# Patient Record
Sex: FEMALE | Race: WHITE | NOT HISPANIC OR LATINO | Employment: UNEMPLOYED | URBAN - METROPOLITAN AREA
[De-identification: names, ages, dates, MRNs, and addresses within clinical notes are randomized per-mention and may not be internally consistent; named-entity substitution may affect disease eponyms.]

---

## 2024-10-04 PROBLEM — Z85.528 HISTORY OF RENAL CELL CARCINOMA: Status: ACTIVE | Noted: 2024-10-04
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
## 2024-10-04 PROBLEM — Z87.891 PERSONAL HISTORY OF TOBACCO USE: Status: ACTIVE | Noted: 2024-10-04

## 2024-10-04 PROBLEM — R93.89 ABNORMAL CT SCAN: Status: RESOLVED | Noted: 2021-02-25 | Resolved: 2024-10-04

## 2024-10-04 PROBLEM — E66.01 MORBID OBESITY DUE TO EXCESS CALORIES: Status: RESOLVED | Noted: 2020-12-21 | Resolved: 2024-10-04

## 2024-10-04 PROBLEM — Z91.89 CARDIOVASCULAR EVENT RISK: Status: RESOLVED | Noted: 2022-04-12 | Resolved: 2024-10-04

## 2024-10-04 PROBLEM — E78.1 HYPERTRIGLYCERIDEMIA: Status: RESOLVED | Noted: 2022-04-12 | Resolved: 2024-10-04

## 2024-10-15 ENCOUNTER — OFFICE VISIT (OUTPATIENT)
Dept: OTOLARYNGOLOGY | Facility: CLINIC | Age: 59
End: 2024-10-15
Payer: MEDICAID

## 2024-10-15 VITALS — BODY MASS INDEX: 43.22 KG/M2 | WEIGHT: 253.13 LBS | HEIGHT: 64 IN

## 2024-10-15 DIAGNOSIS — Z87.891 PERSONAL HISTORY OF TOBACCO USE: ICD-10-CM

## 2024-10-15 DIAGNOSIS — R49.0 HOARSENESS: ICD-10-CM

## 2024-10-15 DIAGNOSIS — J31.2 CHRONIC SORE THROAT: Primary | ICD-10-CM

## 2024-10-15 DIAGNOSIS — G47.33 OSA (OBSTRUCTIVE SLEEP APNEA): ICD-10-CM

## 2024-10-15 PROCEDURE — 3008F BODY MASS INDEX DOCD: CPT | Mod: CPTII,,, | Performed by: OTOLARYNGOLOGY

## 2024-10-15 PROCEDURE — 31575 DIAGNOSTIC LARYNGOSCOPY: CPT | Mod: S$PBB,,, | Performed by: OTOLARYNGOLOGY

## 2024-10-15 PROCEDURE — 1159F MED LIST DOCD IN RCRD: CPT | Mod: CPTII,,, | Performed by: OTOLARYNGOLOGY

## 2024-10-15 PROCEDURE — 31575 DIAGNOSTIC LARYNGOSCOPY: CPT | Mod: PBBFAC,PN | Performed by: OTOLARYNGOLOGY

## 2024-10-15 PROCEDURE — 3044F HG A1C LEVEL LT 7.0%: CPT | Mod: CPTII,,, | Performed by: OTOLARYNGOLOGY

## 2024-10-15 PROCEDURE — 99999 PR PBB SHADOW E&M-EST. PATIENT-LVL IV: CPT | Mod: PBBFAC,,, | Performed by: OTOLARYNGOLOGY

## 2024-10-15 PROCEDURE — 1160F RVW MEDS BY RX/DR IN RCRD: CPT | Mod: CPTII,,, | Performed by: OTOLARYNGOLOGY

## 2024-10-15 PROCEDURE — 99204 OFFICE O/P NEW MOD 45 MIN: CPT | Mod: 25,S$PBB,, | Performed by: OTOLARYNGOLOGY

## 2024-10-15 PROCEDURE — 99214 OFFICE O/P EST MOD 30 MIN: CPT | Mod: PBBFAC,PN | Performed by: OTOLARYNGOLOGY

## 2024-10-15 RX ORDER — OMEPRAZOLE 40 MG/1
40 CAPSULE, DELAYED RELEASE ORAL
Qty: 60 CAPSULE | Refills: 3 | Status: SHIPPED | OUTPATIENT
Start: 2024-10-15 | End: 2025-02-12

## 2024-10-15 NOTE — PROGRESS NOTES
"Subjective:       Patient ID: Yenifer Cornelius is a 58 y.o. female.    Chief Complaint: Sore Throat (Pt c/o sore throat and trouble swallowing )      This patient comes in with the daughter to discuss some issues what has really precipitated her visit is that a friend of hers was diagnose with "throat cancer" and she does have chronic sore throat has a long smoking history in his concerned about that possibility.    She smokes about a pack per day and vapes some she has quit before using Chantix and she is back on Chantix she tells me trying to quit once again.  She has a history of sleep apnea but there was some problem with her equipment and a new mask has come in so she has been untreated for some time but has the equipment and anticipate setting that up and revisiting treatment soon.          Objective:      ENT Physical Exam    So the patient has mildly rough voice she is overweight smells of cigarette smoke     Her nasal exam looks pretty good oropharynx shows significant redness in general posterior oropharynx soft palate    Her neck is without adenopathy her thyroid is not palpable and that is not tender to palpate     We discussed the benefits of a fiberoptic exam that include a better view of the larynx a better view of the anterior aspect of the larynx a more magnified view of the larynx hypopharynx and nasopharynx in the context of the patient's particular complaint and the patient wishes to proceed with this minor examination.  In this particular patient examination with the mirror was attempted but inadequate to provide the necessary exam.    Afrin and lidocaine were atomized into the nasal airway 5 or so minutes were given for the decongestant and anesthetic to take effect and then the flexible fiberoptic laryngoscope was passed through the nasal cavity and the exam proceeded.    The right side was chosen to approach and the scope passed easily the nasopharynx is generally red presumably from cigarette " smoke exposure the hypopharynx larynx are all prominently read in a diffuse fashion although there is focal redness of the right arytenoid in particular but no ulceration.      She has mild early Kaila's edema that is symmetric bilaterally with no leukoplakia or focal lesions.            Assessment:       1. Chronic sore throat    2. Personal history of tobacco use    3. Hoarseness    4. CAROLINE (obstructive sleep apnea)         Plan:          So I have encouraged her to continue her efforts to quit smoking even if it include staying on a vaporizer but not smoking cigarettes.  I have encouraged her to take action to get her CPAP device up and running she has the equipment come in in her daughter says they will set it up soon     She does mentioned some regurgitation at night some reflux events and the redness, while the general redness is likely from the heavy smoking and vaping, there is focal redness of the arytenoid that maybe reflux-related and given her body habitus her history towards heartburn and indigestion that she controls with diet her untreated sleep apnea I think reflux is probably the most likely explanation for her chronic sore throat.    I have put her on b.i.d. PPIs to help with the chronic sore throat and the hoarseness although some of that as probably Kaila's edema, I have mentioned to her that twice a day dosing is not for long term that is for a month or so to get things improved and then to switch to once a day and to contact me if they are not sure what to do and if it has not help the sore throat I need to see her back.

## 2024-10-30 ENCOUNTER — OFFICE VISIT (OUTPATIENT)
Dept: FAMILY MEDICINE | Facility: CLINIC | Age: 59
End: 2024-10-30
Payer: MEDICAID

## 2024-10-30 VITALS
SYSTOLIC BLOOD PRESSURE: 130 MMHG | WEIGHT: 254.5 LBS | OXYGEN SATURATION: 97 % | HEIGHT: 64 IN | HEART RATE: 92 BPM | BODY MASS INDEX: 43.45 KG/M2 | DIASTOLIC BLOOD PRESSURE: 84 MMHG

## 2024-10-30 DIAGNOSIS — E66.813 CLASS 3 SEVERE OBESITY DUE TO EXCESS CALORIES WITH SERIOUS COMORBIDITY AND BODY MASS INDEX (BMI) OF 40.0 TO 44.9 IN ADULT: Primary | ICD-10-CM

## 2024-10-30 DIAGNOSIS — E66.01 CLASS 3 SEVERE OBESITY DUE TO EXCESS CALORIES WITH SERIOUS COMORBIDITY AND BODY MASS INDEX (BMI) OF 40.0 TO 44.9 IN ADULT: Primary | ICD-10-CM

## 2024-10-30 DIAGNOSIS — I10 PRIMARY HYPERTENSION: ICD-10-CM

## 2024-10-30 DIAGNOSIS — Z87.891 PERSONAL HISTORY OF TOBACCO USE: ICD-10-CM

## 2024-10-30 DIAGNOSIS — E78.5 DYSLIPIDEMIA: ICD-10-CM

## 2024-10-30 PROCEDURE — 3075F SYST BP GE 130 - 139MM HG: CPT | Mod: CPTII,S$GLB,, | Performed by: STUDENT IN AN ORGANIZED HEALTH CARE EDUCATION/TRAINING PROGRAM

## 2024-10-30 PROCEDURE — 3008F BODY MASS INDEX DOCD: CPT | Mod: CPTII,S$GLB,, | Performed by: STUDENT IN AN ORGANIZED HEALTH CARE EDUCATION/TRAINING PROGRAM

## 2024-10-30 PROCEDURE — 3079F DIAST BP 80-89 MM HG: CPT | Mod: CPTII,S$GLB,, | Performed by: STUDENT IN AN ORGANIZED HEALTH CARE EDUCATION/TRAINING PROGRAM

## 2024-10-30 PROCEDURE — 1160F RVW MEDS BY RX/DR IN RCRD: CPT | Mod: CPTII,S$GLB,, | Performed by: STUDENT IN AN ORGANIZED HEALTH CARE EDUCATION/TRAINING PROGRAM

## 2024-10-30 PROCEDURE — 99214 OFFICE O/P EST MOD 30 MIN: CPT | Mod: S$GLB,,, | Performed by: STUDENT IN AN ORGANIZED HEALTH CARE EDUCATION/TRAINING PROGRAM

## 2024-10-30 PROCEDURE — 1159F MED LIST DOCD IN RCRD: CPT | Mod: CPTII,S$GLB,, | Performed by: STUDENT IN AN ORGANIZED HEALTH CARE EDUCATION/TRAINING PROGRAM

## 2024-10-30 PROCEDURE — G2211 COMPLEX E/M VISIT ADD ON: HCPCS | Mod: S$GLB,,, | Performed by: STUDENT IN AN ORGANIZED HEALTH CARE EDUCATION/TRAINING PROGRAM

## 2024-10-30 PROCEDURE — 3044F HG A1C LEVEL LT 7.0%: CPT | Mod: CPTII,S$GLB,, | Performed by: STUDENT IN AN ORGANIZED HEALTH CARE EDUCATION/TRAINING PROGRAM

## 2024-10-30 RX ORDER — VARENICLINE TARTRATE 1 MG/1
1 TABLET, FILM COATED ORAL 2 TIMES DAILY
Qty: 60 TABLET | Refills: 2 | Status: SHIPPED | OUTPATIENT
Start: 2024-10-30

## 2024-10-31 DIAGNOSIS — M79.671 RIGHT FOOT PAIN: Primary | ICD-10-CM

## 2024-11-04 ENCOUNTER — HOSPITAL ENCOUNTER (OUTPATIENT)
Dept: RADIOLOGY | Facility: HOSPITAL | Age: 59
Discharge: HOME | End: 2024-11-04
Attending: ORTHOPAEDIC SURGERY
Payer: MEDICAID

## 2024-11-04 ENCOUNTER — OFFICE VISIT (OUTPATIENT)
Dept: ORTHOPEDICS | Facility: CLINIC | Age: 59
End: 2024-11-04
Payer: MEDICAID

## 2024-11-04 DIAGNOSIS — M79.671 RIGHT FOOT PAIN: Primary | ICD-10-CM

## 2024-11-04 DIAGNOSIS — M79.671 RIGHT FOOT PAIN: ICD-10-CM

## 2024-11-04 PROCEDURE — 99213 OFFICE O/P EST LOW 20 MIN: CPT | Mod: PBBFAC,25,PN | Performed by: ORTHOPAEDIC SURGERY

## 2024-11-04 PROCEDURE — 1160F RVW MEDS BY RX/DR IN RCRD: CPT | Mod: CPTII,,, | Performed by: ORTHOPAEDIC SURGERY

## 2024-11-04 PROCEDURE — 73610 X-RAY EXAM OF ANKLE: CPT | Mod: 26,RT,, | Performed by: RADIOLOGY

## 2024-11-04 PROCEDURE — 1159F MED LIST DOCD IN RCRD: CPT | Mod: CPTII,,, | Performed by: ORTHOPAEDIC SURGERY

## 2024-11-04 PROCEDURE — 99215 OFFICE O/P EST HI 40 MIN: CPT | Mod: S$PBB,,, | Performed by: ORTHOPAEDIC SURGERY

## 2024-11-04 PROCEDURE — 99999 PR PBB SHADOW E&M-EST. PATIENT-LVL III: CPT | Mod: PBBFAC,,, | Performed by: ORTHOPAEDIC SURGERY

## 2024-11-04 PROCEDURE — 73610 X-RAY EXAM OF ANKLE: CPT | Mod: TC,PN,RT

## 2024-11-04 PROCEDURE — 3044F HG A1C LEVEL LT 7.0%: CPT | Mod: CPTII,,, | Performed by: ORTHOPAEDIC SURGERY

## 2024-11-04 RX ORDER — DICLOFENAC SODIUM 75 MG/1
75 TABLET, DELAYED RELEASE ORAL 2 TIMES DAILY
Qty: 28 TABLET | Refills: 0 | Status: SHIPPED | OUTPATIENT
Start: 2024-11-04

## 2024-11-04 NOTE — PROGRESS NOTES
Subjective:      Patient ID: Yenifer Cornelius is a 59 y.o. female.    Chief Complaint: Pain of the Right Ankle    HPI  59-year-old female presents with a several years of intermittent but progressive right foot and ankle pain and weakness.  She recalls a traumatic event 3-4 years ago she has had no recent treatment for her foot and ankle pain.  She is describing progressive pain with the walking.  Feels like her ankles going to give way.  ROS      Objective:    Ortho Exam     Constitutional:   Patient is alert  and oriented in no acute distress  HEENT:  normocephalic atraumatic; PERRL EOMI  Neck:  Supple without adenopathy  Cardiovascular:  Normal rate and rhythm  Pulmonary:  Normal respiratory effort normal chest wall expansion  Abdominal:  Nonprotuberant nondistended  Musculoskeletal:  Patient has a minimally antalgic gait  Some subtle swelling over the lateral aspect of the foot and ankle  Particularly along the course of the peroneal tendons.  She has some pes planus  She has adequate motor strength  Neurological:  No focal defect; cranial nerves 2-12 grossly intact  Psychiatric/behavioral:  Mood and behavior normal      X-Ray Ankle Complete Right  Narrative: EXAMINATION:  jXR ANKLE COMPLETE 3 VIEW RIGHT    CLINICAL HISTORY:  Pain in right foot    TECHNIQUE:  AP, lateral, and oblique images of the right ankle were performed.    COMPARISON:  08/19/2022.    FINDINGS:  There is soft tissue swelling.  No acute fracture.  There is posttraumatic deformity involving the tip of the medial malleolus.  Tibiotalar articulation intact.  There is bone demineralization.  Chronic pes planus.  Impression: 1. Soft tissue swelling without acute fracture.  2. Bone demineralization.  3. Pes planus.    Electronically signed by: Rao Norris  Date:    11/04/2024  Time:    13:47       My Radiographs Findings:    I have personally reviewed radiographs and concur with above findings    Assessment:       Encounter Diagnosis   Name Primary?     Right foot pain Yes         Plan:       I have discussed medical condition treatment options with her at length.  For ankle support we will get him into a walking boot I have discussed ice elevation compressive wrapping NSAIDs as needed.  I think she would benefit from some formal therapy.  If symptoms persist I would suggest follow up with the ortho foot and ankle.        Past Medical History:   Diagnosis Date    Anxiety     Arthritis     Back pain     Cancer     RENAL  NEW DX    COPD (chronic obstructive pulmonary disease)     Depression     Hepatitis C     took tx    Hypertension     Migraines     with aura    Renal disorder     STONES NOW    Sleep apnea     Wears dentures     UPPER ONLY     Past Surgical History:   Procedure Laterality Date    CHOLECYSTECTOMY      COLONOSCOPY  01/25/2019    Radha Hayes, records in media    COLONOSCOPY N/A 3/27/2023    Procedure: COLONOSCOPY;  Surgeon: Fuad Guzman MD;  Location: [facility];  Service: General;  Laterality: N/A;    ENDOSCOPIC ULTRASOUND OF UPPER GASTROINTESTINAL TRACT N/A 02/25/2021    Procedure: ULTRASOUND, UPPER GI TRACT, ENDOSCOPIC;  Surgeon: Jose Miguel Vuong MD;  Location: [facility] ([address] [place] [address]);  Service: Endoscopy;  Laterality: N/A;  UEUS/FNA for enlarged lymph node reauested by Dr Alec Everett.  Rapid Covid-19 test 1130 (scheduled late) - pg    ESOPHAGOGASTRODUODENOSCOPY N/A 01/13/2021    Procedure: ESOPHAGOGASTRODUODENOSCOPY (EGD);  Surgeon: Naseem Hayes MD;  Location: [facility];  Service: Endoscopy;  Laterality: N/A;    ESOPHAGOGASTRODUODENOSCOPY N/A 05/24/2022    Procedure: EGD (ESOPHAGOGASTRODUODENOSCOPY);  Surgeon: Naseem Hayes MD;  Location: [facility];  Service: Endoscopy;  Laterality: N/A;    ESOPHAGOGASTRODUODENOSCOPY  01/25/2019    Naseem Thomson MD records in media    EXCISION, LESION, BREAST, WITH NEEDLE LOCALIZATION Left 4/17/2024    Procedure: EXCISION, LESION, BREAST, WITH NEEDLE LOCALIZATION;  Surgeon:  Mode Guevara MD;  Location: [facility] OR;  Service: General;  Laterality: Left;    HERNIA REPAIR      HIATAL HERNIA X 2    HYSTERECTOMY      Partial    REPAIR OF INCARCERATED VENTRAL HERNIA WITHOUT HISTORY OF PRIOR REPAIR N/A 5/3/2023    Procedure: REPAIR, HERNIA, VENTRAL, INCARCERATED, WITHOUT HISTORY OF PRIOR REPAIR;  Surgeon: Fuad Guzman MD;  Location: [facility] OR;  Service: General;  Laterality: N/A;    ROBOT-ASSISTED LAPAROSCOPIC NEPHRECTOMY Right 03/17/2021    Procedure: ROBOTIC NEPHRECTOMY;  Surgeon: Olivia Tam MD;  Location: [facility] OR;  Service: Urology;  Laterality: Right;         Current Outpatient Medications:     albuterol (PROVENTIL/VENTOLIN HFA) 90 mcg/actuation inhaler, Inhale 2 puffs into the lungs every 6 (six) hours as needed for Wheezing., Disp: 18 g, Rfl: 5    amLODIPine (NORVASC) 5 MG tablet, Take 1 tablet (5 mg total) by mouth 2 (two) times daily., Disp: 180 tablet, Rfl: 2    diphenoxylate-atropine 2.5-0.025 mg (LOMOTIL) 2.5-0.025 mg per tablet, Take 1 tablet by mouth 4 (four) times daily as needed for Diarrhea., Disp: 120 tablet, Rfl: 2    doxepin (SINEQUAN) 10 MG capsule, Take 1 capsule (10 mg total) by mouth every evening., Disp: 30 capsule, Rfl: 2    fluticasone-salmeterol diskus inhaler 250-50 mcg, Inhale 1 puff into the lungs 2 (two) times daily. Controller, Disp: 60 each, Rfl: 11    furosemide (LASIX) 20 MG tablet, Take 1 tablet (20 mg total) by mouth 2 (two) times a day. (Patient taking differently: Take 20 mg by mouth 2 (two) times a day. PRN), Disp: 180 tablet, Rfl: 3    gabapentin (NEURONTIN) 800 MG tablet, TAKE 1 TABLET BY MOUTH 3 TIMES DAILY FOR NERVE PAIN., Disp: 90 tablet, Rfl: 11    nystatin (MYCOSTATIN) cream, Apply topically 2 (two) times daily., Disp: 30 g, Rfl: 0    nystatin (MYCOSTATIN) powder, Apply topically 2 (two) times daily., Disp: 60 g, Rfl: 0    omeprazole (PRILOSEC) 40 MG capsule, Take 1 capsule (40 mg total) by mouth 2 (two) times daily before meals.,  Disp: 60 capsule, Rfl: 3    oxyCODONE-acetaminophen (PERCOCET)  mg per tablet, Take 1 tablet by mouth every 6 (six) hours as needed for Pain., Disp: 30 tablet, Rfl: 0    promethazine (PHENERGAN) 50 MG tablet, Take 1 tablet (50 mg total) by mouth every 6 (six) hours as needed for Nausea., Disp: 60 tablet, Rfl: 5    propranoloL (INDERAL LA) 60 MG 24 hr capsule, Take 1 capsule (60 mg total) by mouth every evening., Disp: 30 capsule, Rfl: 5    rizatriptan (MAXALT) 10 MG tablet, Take 10 mg by mouth., Disp: , Rfl:     rosuvastatin (CRESTOR) 10 MG tablet, Take 1 tablet (10 mg total) by mouth once daily., Disp: 90 tablet, Rfl: 3    SPIRIVA RESPIMAT 2.5 mcg/actuation inhaler, Inhale 2 puffs into the lungs Daily., Disp: , Rfl:     varenicline (CHANTIX) 1 mg Tab, Take 1 tablet (1 mg total) by mouth 2 (two) times daily., Disp: 60 tablet, Rfl: 2    albuterol-ipratropium (DUO-NEB) 2.5 mg-0.5 mg/3 mL nebulizer solution, Take 3 mLs by nebulization every 6 (six) hours as needed for Wheezing. Rescue, Disp: 75 mL, Rfl: 0    diclofenac (VOLTAREN) 75 MG EC tablet, Take 1 tablet (75 mg total) by mouth 2 (two) times daily., Disp: 28 tablet, Rfl: 0    semaglutide, weight loss, 0.25 mg/0.5 mL PnIj, Inject 0.25 mg into the skin every 7 days., Disp: 3 mL, Rfl: 2    Review of patient's allergies indicates:   Allergen Reactions    Amoxicillin      Other reaction(s): (moderate to severe)    Clarithromycin      Other reaction(s): (moderate to severe)    Codeine      Other reaction(s): (moderate to severe)  Other reaction(s): (moderate to severe)    Penicillin      Other reaction(s): (severe)    Sulfa (sulfonamide antibiotics)      Other reaction(s): (severe)    Flagyl [metronidazole hcl]     Sumatriptan        Family History   Problem Relation Name Age of Onset    Alzheimer's disease Mother Amy Nicholas     COPD Mother Amy Nicholas     Stroke Mother Amy Yu     Vision loss Mother Amy Nicholas     Asthma Daughter Dianna  cornelius Leyva daughter     Depression Daughter Dianna cornelius Leyva daughter     Drug abuse Daughter Dianna cornelius Leyva daughter         Meth.    Mental illness Daughter Dianna cornelius Leyva daughter     Breast cancer Maternal Aunt      Colon cancer Maternal Uncle      Breast cancer Paternal Aunt      Alzheimer's disease Maternal Grandmother Marielena Nuñez momma     Hyperthyroidism Maternal Grandmother Marielena brice     Kidney disease Maternal Grandmother Marielena brice     Alzheimer's disease Maternal Grandfather Pedro dave     Stroke Maternal Grandfather Pedro dave     Breast cancer Paternal Grandmother      Depression Son Pedro Cornelius              Social History     Occupational History    Not on file   Tobacco Use    Smoking status: Every Day     Current packs/day: 0.30     Average packs/day: 0.5 packs/day for 43.9 years (22.0 ttl pk-yrs)     Types: Cigarettes, Vaping with nicotine     Start date:      Last attempt to quit: 3/1/2023     Passive exposure: Current    Smokeless tobacco: Never   Substance and Sexual Activity    Alcohol use: No    Drug use: Never    Sexual activity: Not Currently     Partners: Male     Birth control/protection: None

## 2025-01-09 ENCOUNTER — PATIENT OUTREACH (OUTPATIENT)
Dept: ADMINISTRATIVE | Facility: HOSPITAL | Age: 60
End: 2025-01-09
Payer: MEDICAID

## 2025-01-09 ENCOUNTER — OFFICE VISIT (OUTPATIENT)
Dept: FAMILY MEDICINE | Facility: CLINIC | Age: 60
End: 2025-01-09
Payer: MEDICAID

## 2025-01-09 VITALS
BODY MASS INDEX: 42.85 KG/M2 | HEART RATE: 80 BPM | SYSTOLIC BLOOD PRESSURE: 132 MMHG | HEIGHT: 64 IN | DIASTOLIC BLOOD PRESSURE: 74 MMHG | OXYGEN SATURATION: 99 % | WEIGHT: 251 LBS

## 2025-01-09 DIAGNOSIS — F51.04 PSYCHOPHYSIOLOGICAL INSOMNIA: ICD-10-CM

## 2025-01-09 DIAGNOSIS — E66.813 CLASS 3 SEVERE OBESITY DUE TO EXCESS CALORIES WITH SERIOUS COMORBIDITY AND BODY MASS INDEX (BMI) OF 40.0 TO 44.9 IN ADULT: ICD-10-CM

## 2025-01-09 DIAGNOSIS — E78.5 DYSLIPIDEMIA: ICD-10-CM

## 2025-01-09 DIAGNOSIS — I10 PRIMARY HYPERTENSION: ICD-10-CM

## 2025-01-09 DIAGNOSIS — K04.7 DENTAL ABSCESS: ICD-10-CM

## 2025-01-09 DIAGNOSIS — E66.01 CLASS 3 SEVERE OBESITY DUE TO EXCESS CALORIES WITH SERIOUS COMORBIDITY AND BODY MASS INDEX (BMI) OF 40.0 TO 44.9 IN ADULT: ICD-10-CM

## 2025-01-09 DIAGNOSIS — J44.9 CHRONIC OBSTRUCTIVE PULMONARY DISEASE, UNSPECIFIED COPD TYPE: Primary | ICD-10-CM

## 2025-01-09 PROCEDURE — 1159F MED LIST DOCD IN RCRD: CPT | Mod: CPTII,S$GLB,, | Performed by: STUDENT IN AN ORGANIZED HEALTH CARE EDUCATION/TRAINING PROGRAM

## 2025-01-09 PROCEDURE — 1160F RVW MEDS BY RX/DR IN RCRD: CPT | Mod: CPTII,S$GLB,, | Performed by: STUDENT IN AN ORGANIZED HEALTH CARE EDUCATION/TRAINING PROGRAM

## 2025-01-09 PROCEDURE — G2211 COMPLEX E/M VISIT ADD ON: HCPCS | Mod: S$GLB,,, | Performed by: STUDENT IN AN ORGANIZED HEALTH CARE EDUCATION/TRAINING PROGRAM

## 2025-01-09 PROCEDURE — 3078F DIAST BP <80 MM HG: CPT | Mod: CPTII,S$GLB,, | Performed by: STUDENT IN AN ORGANIZED HEALTH CARE EDUCATION/TRAINING PROGRAM

## 2025-01-09 PROCEDURE — 3008F BODY MASS INDEX DOCD: CPT | Mod: CPTII,S$GLB,, | Performed by: STUDENT IN AN ORGANIZED HEALTH CARE EDUCATION/TRAINING PROGRAM

## 2025-01-09 PROCEDURE — 99214 OFFICE O/P EST MOD 30 MIN: CPT | Mod: S$GLB,,, | Performed by: STUDENT IN AN ORGANIZED HEALTH CARE EDUCATION/TRAINING PROGRAM

## 2025-01-09 PROCEDURE — 3075F SYST BP GE 130 - 139MM HG: CPT | Mod: CPTII,S$GLB,, | Performed by: STUDENT IN AN ORGANIZED HEALTH CARE EDUCATION/TRAINING PROGRAM

## 2025-01-09 RX ORDER — BUDESONIDE, GLYCOPYRROLATE, AND FORMOTEROL FUMARATE 160; 9; 4.8 UG/1; UG/1; UG/1
1 AEROSOL, METERED RESPIRATORY (INHALATION) 2 TIMES DAILY
Qty: 10.7 G | Refills: 5 | Status: SHIPPED | OUTPATIENT
Start: 2025-01-09

## 2025-01-09 RX ORDER — CLINDAMYCIN HYDROCHLORIDE 300 MG/1
300 CAPSULE ORAL EVERY 6 HOURS
Qty: 28 CAPSULE | Refills: 0 | Status: SHIPPED | OUTPATIENT
Start: 2025-01-09 | End: 2025-01-16

## 2025-01-09 NOTE — PATIENT INSTRUCTIONS
Jonh Street,     If you are due for any health screening(s) below please notify me so we can arrange them to be ordered and scheduled. Most healthy patients at your age complete them, but you are free to accept or refuse.     If you can't do it, I'll definitely understand. If you can, I'd certainly appreciate it!    All of your core healthy metrics are met.

## 2025-01-09 NOTE — PROGRESS NOTES
Population Health Chart Review & Patient Outreach Details      Additional Pop Health Notes:               Updates Requested / Reviewed:      Updated Care Coordination Note         Health Maintenance Topics Overdue:      VB Score: 1     LDCT Lung Screen                       Health Maintenance Topic(s) Outreach Outcomes & Actions Taken:    Low Dose CT Screening - Outreach Outcomes & Actions Taken  : External Records Requested & Care Team Updated if Applicable

## 2025-01-09 NOTE — LETTER
AUTHORIZATION FOR RELEASE OF   CONFIDENTIAL INFORMATION    Dear [facility] Medical Records,    We are seeing Yenifer Cornelius, date of birth 1965, in the clinic at [facility]. Betty Rey DO is the patient's PCP. Yenifer Cornelius has an outstanding lab/procedure at the time we reviewed her chart. In order to help keep her health information updated, she has authorized us to request the following medical record(s):        (  )  MAMMOGRAM                                      (  )  COLONOSCOPY      (  )  PAP SMEAR                                          (  )  OUTSIDE LAB RESULTS     (  )  DEXA SCAN                                          (  )  EYE EXAM            (  )  FOOT EXAM                                          (  )  ENTIRE RECORD     (  )  OUTSIDE IMMUNIZATIONS                 ( X )  LUNG CT         Please fax records to [facility], Hairston, Taylor, DO at [phone]    Thanks so much and have a great day!    Leni Sanders LPN [facility]  [address] [place] [address]  P- [phone]  F- [phone]           Patient Name: Yenifer Cornelius  : 1965  Patient Phone #: [phone]

## 2025-01-09 NOTE — PROGRESS NOTES
[facility] - [place], [place]    Family Medicine Office Visit    Subjective     Patient ID: Yenifer Cornelius is a 59 y.o. female who presents to clinic today to follow up.     Medical history, surgical history, medications, allergies, and social history were reviewed and updated.     Chief Complaint: COPD and Obesity    HPI    She has a history of COPD and uses an albuterol inhaler as needed, fluticasone-salmeterol Diskus twice daily, and Spiriva daily.  She is followed by pulmonology through [facility].  Reported that she just saw them last week.  Reported that she was requesting a triple inhaler, but they did not send it in.  Requesting that we complete this today.    She reports a dental abscess after she broke her tooth on her right lower jaw.  She is allergic to penicillin.  Reported that she is in the process of finding a dentist.  She is taking clindamycin in the past without issue.  We will start her on a course of this.    She has a history of obesity with current weight of 251 lb and BMI of 43.1.  She has been working on diet and exercise, but her physical activity is limited due to her COPD.  We have discussed a trial of Wegovy in the past, which she was agreeable to.  No history of thyroid disorder, thyroid cancer, or pancreatitis.  We discussed starting Wegovy at 0.25 mg and then titrating up as tolerated and continuing to monitor her weight.    She has a history of hypertension that is currently well-controlled with amlodipine 5 mg twice daily.  Reported that she has discontinued her propranolol.  Blood pressure is at goal today.  We will continue her regimen.    She has a history of dyslipidemia with current medication regimen of Crestor 10 mg daily.  No new or worsening symptoms reported.  We will continue current regimen.    She does report a history of insomnia.  Was previously taking Ambien, but we had discontinued this as she is on multiple controlled substances.  We had  completed a trial of doxepin, but she reports it was not working well for her.  Recommended that she touch base with [facility] sleep medicine through pulmonology.    Vitals:    01/09/25 1306   BP: 132/74   Pulse: 80      Wt Readings from Last 3 Encounters:   01/09/25 1306 113.9 kg (251 lb)   10/30/24 1143 115.4 kg (254 lb 8 oz)   10/15/24 1317 114.8 kg (253 lb 1.6 oz)      Review of Systems    Review of Systems otherwise negative unless specified above.        Objective     Physical Exam  Vitals reviewed.   Constitutional:       General: She is not in acute distress.     Appearance: Normal appearance. She is obese.   HENT:      Head: Normocephalic and atraumatic.      Nose: Nose normal.      Mouth/Throat:      Mouth: Mucous membranes are moist.   Cardiovascular:      Rate and Rhythm: Normal rate and regular rhythm.      Heart sounds: No murmur heard.  Pulmonary:      Effort: Pulmonary effort is normal. No respiratory distress.      Breath sounds: Normal breath sounds.   Musculoskeletal:         General: Normal range of motion.   Skin:     General: Skin is warm and dry.   Neurological:      General: No focal deficit present.      Mental Status: She is alert and oriented to person, place, and time.   Psychiatric:         Mood and Affect: Mood normal.         Behavior: Behavior normal.            Assessment and Plan     Current Outpatient Medications   Medication Instructions    albuterol (PROVENTIL/VENTOLIN HFA) 90 mcg/actuation inhaler 2 puffs, Inhalation, Every 6 hours PRN    amLODIPine (NORVASC) 5 mg, Oral, 2 times daily    budesonide-glycopyr-formoterol (BREZTRI AEROSPHERE) 160-9-4.8 mcg/actuation HFAA 1 puff, Inhalation, 2 times daily    clindamycin (CLEOCIN) 300 mg, Oral, Every 6 hours    diclofenac (VOLTAREN) 75 mg, Oral, 2 times daily    diphenoxylate-atropine 2.5-0.025 mg (LOMOTIL) 2.5-0.025 mg per tablet 1 tablet, Oral, 4 times daily PRN    furosemide (LASIX) 20 mg, Oral, 2 times daily    gabapentin  (NEURONTIN) 800 MG tablet TAKE 1 TABLET BY MOUTH 3 TIMES DAILY FOR NERVE PAIN.    nystatin (MYCOSTATIN) cream Topical (Top), 2 times daily    nystatin (MYCOSTATIN) powder Topical (Top), 2 times daily    omeprazole (PRILOSEC) 40 mg, Oral, 2 times daily before meals    oxyCODONE-acetaminophen (PERCOCET)  mg per tablet 1 tablet, Oral, Every 6 hours PRN    promethazine (PHENERGAN) 50 mg, Oral, Every 6 hours PRN    rizatriptan (MAXALT) 10 mg    rosuvastatin (CRESTOR) 10 mg, Oral, Daily    semaglutide (weight loss) 0.25 mg, Subcutaneous, Every 7 days        1. Chronic obstructive pulmonary disease, unspecified COPD type  -     budesonide-glycopyr-formoterol (BREZTRI AEROSPHERE) 160-9-4.8 mcg/actuation HFAA; Inhale 1 puff into the lungs 2 (two) times daily.  Dispense: 10.7 g; Refill: 5    2. Dental abscess  -     clindamycin (CLEOCIN) 300 MG capsule; Take 1 capsule (300 mg total) by mouth every 6 (six) hours. for 7 days  Dispense: 28 capsule; Refill: 0    3. Class 3 severe obesity due to excess calories with serious comorbidity and body mass index (BMI) of 40.0 to 44.9 in adult  -     semaglutide, weight loss, 0.25 mg/0.5 mL PnIj; Inject 0.25 mg into the skin every 7 days.  Dispense: 3 mL; Refill: 2    4. Primary hypertension  -     semaglutide, weight loss, 0.25 mg/0.5 mL PnIj; Inject 0.25 mg into the skin every 7 days.  Dispense: 3 mL; Refill: 2    5. Dyslipidemia  -     semaglutide, weight loss, 0.25 mg/0.5 mL PnIj; Inject 0.25 mg into the skin every 7 days.  Dispense: 3 mL; Refill: 2    6. Psychophysiological insomnia        Starting her on Breztri as above.  Starting her on clindamycin for dental abscess.  Reordering Wegovy.  Recommend that she touch base with sleep Medicine regarding her insomnia.  She reported that she completed her CT lung screen through [facility].  We will work on obtaining these records.  Otherwise, recommend follow up in 3 months or sooner if needed.    Visit today included increased  complexity associated with the care of the episodic problem COPD, obesity addressed and managing the longitudinal care of the patient due to the serious and/or complex managed problem(s) COPD, obesity.         Follow up in about 3 months (around 4/9/2025) for Follow up with [facility].    Questions were invited and answered. No other acute concerns at this time. Will plan to follow up as above or sooner if needed.     Betty Rey,   01/09/2025 2:16 PM       This dictation has been generated using Modal Fluency Dictation. Some phonetic errors may occur. Please contact author for clarification if needed.

## 2025-01-10 NOTE — PROGRESS NOTES
External chest CT received, uploaded to chart and  hyper-linked in .  Pulmonary function test scanned to media

## 2025-02-02 DIAGNOSIS — R11.0 NAUSEA: ICD-10-CM

## 2025-02-02 NOTE — TELEPHONE ENCOUNTER
Care Due:                  Date            Visit Type   Department     Provider  --------------------------------------------------------------------------------                                EP -                              PRIMARY      [facility]  Last Visit: 01-      CARE ([facility])   EDITH Rey                              EP -                              PRIMARY      [facility]  Next Visit: 04-      CARE ([facility])   [facility]       Betty Rey                                                            Last  Test          Frequency    Reason                     Performed    Due Date  --------------------------------------------------------------------------------    HBA1C.......  6 months...  semaglutide,.............  10-   04-    [employer] Embedded Care Due Messages. Reference number: [id number].   2/02/2025 3:08:10 PM CST

## 2025-02-03 RX ORDER — PROMETHAZINE HYDROCHLORIDE 50 MG/1
50 TABLET ORAL EVERY 8 HOURS PRN
Qty: 60 TABLET | Refills: 1 | Status: SHIPPED | OUTPATIENT
Start: 2025-02-03

## 2025-02-03 NOTE — TELEPHONE ENCOUNTER
Refill Routing Note   Medication(s) are not appropriate for processing by [facility] for the following reason(s):        Outside of protocol    [facility] action(s):  Route     Requires labs : Yes             Appointments  past 12m or future 3m with PCP    Date Provider   Last Visit   1/9/2025 Betty Rey, DO   Next Visit   4/9/2025 Betty Rey, DO   ED visits in past 90 days: 0        Note composed:8:15 AM 02/03/2025

## 2025-02-10 DIAGNOSIS — B37.2 CUTANEOUS CANDIDIASIS: ICD-10-CM

## 2025-02-10 DIAGNOSIS — R07.89 CHEST TIGHTNESS: ICD-10-CM

## 2025-02-10 DIAGNOSIS — I10 PRIMARY HYPERTENSION: ICD-10-CM

## 2025-02-10 NOTE — TELEPHONE ENCOUNTER
No care due was identified.  [facility] Embedded Care Due Messages. Reference number: [id number].   2/10/2025 5:56:07 PM CST

## 2025-02-11 RX ORDER — NYSTATIN 100000 [USP'U]/G
POWDER TOPICAL 2 TIMES DAILY
Qty: 60 G | Refills: 1 | Status: SHIPPED | OUTPATIENT
Start: 2025-02-11

## 2025-02-11 RX ORDER — AMLODIPINE BESYLATE 5 MG/1
5 TABLET ORAL 2 TIMES DAILY
Qty: 180 TABLET | Refills: 3 | Status: SHIPPED | OUTPATIENT
Start: 2025-02-11 | End: 2025-02-11

## 2025-02-11 RX ORDER — AMLODIPINE BESYLATE 10 MG/1
10 TABLET ORAL DAILY
Qty: 90 TABLET | Refills: 1 | Status: SHIPPED | OUTPATIENT
Start: 2025-02-11 | End: 2026-02-11

## 2025-02-11 NOTE — TELEPHONE ENCOUNTER
Refill Routing Note   Medication(s) are not appropriate for processing by [facility] for the following reason(s):        Outside of protocol    [facility] action(s):  Route  Approve      Medication Therapy Plan: Route Nystatin      Appointments  past 12m or future 3m with PCP    Date Provider   Last Visit   1/9/2025 Betty Rey, DO   Next Visit   4/9/2025 Betty Rey, DO   ED visits in past 90 days: 0        Note composed:8:08 AM 02/11/2025

## 2025-02-11 NOTE — TELEPHONE ENCOUNTER
Refill Routing Note   Medication(s) are not appropriate for processing by [facility] for the following reason(s):        Outside of protocol; [place]STATIN    [facility] action(s):  Route  Approve               Appointments  past 12m or future 3m with PCP    Date Provider   Last Visit   1/9/2025 Betty Rey, DO   Next Visit   4/9/2025 Betty Rey, DO   ED visits in past 90 days: 0        Note composed:9:24 AM 02/11/2025

## 2025-02-25 ENCOUNTER — TELEPHONE (OUTPATIENT)
Dept: FAMILY MEDICINE | Facility: CLINIC | Age: 60
End: 2025-02-25
Payer: MEDICAID

## 2025-02-25 NOTE — TELEPHONE ENCOUNTER
No care due was identified.  [facility] Embedded Care Due Messages. Reference number: [id number].   2/25/2025 5:01:10 PM CST

## 2025-02-25 NOTE — TELEPHONE ENCOUNTER
----- Message from Jenn sent at 2/25/2025  4:50 PM CST -----  Regarding: Refill  Type:  RX Refill RequestWho Called: PtRefill or New Rx:refillRX Name and Strength:furosemide (LASIX) 20 MG tablet How is the patient currently taking it? (ex. 1XDay):2xdayIs this a 30 day or 90 day RX:60Preferred Pharmacy with phone number:[facility] [place], [place] - [address] [address]Efpyz: [phone] Fax: [phone] or Mail Order:LocalOrdering Provider:.Would the patient rather a call back or a response via [facility]? [facility] Number:[phone] Information: Thanks   Statement Selected

## 2025-02-26 RX ORDER — FUROSEMIDE 20 MG/1
20 TABLET ORAL DAILY PRN
Qty: 30 TABLET | Refills: 2 | Status: SHIPPED | OUTPATIENT
Start: 2025-02-26

## 2025-04-09 ENCOUNTER — OFFICE VISIT (OUTPATIENT)
Dept: FAMILY MEDICINE | Facility: CLINIC | Age: 60
End: 2025-04-09
Payer: MEDICAID

## 2025-04-09 VITALS
DIASTOLIC BLOOD PRESSURE: 70 MMHG | HEART RATE: 102 BPM | RESPIRATION RATE: 16 BRPM | OXYGEN SATURATION: 91 % | HEIGHT: 64 IN | BODY MASS INDEX: 44.41 KG/M2 | SYSTOLIC BLOOD PRESSURE: 94 MMHG | WEIGHT: 260.13 LBS

## 2025-04-09 DIAGNOSIS — R11.0 NAUSEA: ICD-10-CM

## 2025-04-09 DIAGNOSIS — J44.1 COPD WITH ACUTE EXACERBATION: ICD-10-CM

## 2025-04-09 DIAGNOSIS — J44.9 CHRONIC OBSTRUCTIVE PULMONARY DISEASE, UNSPECIFIED COPD TYPE: Primary | ICD-10-CM

## 2025-04-09 DIAGNOSIS — Z12.31 ENCOUNTER FOR SCREENING MAMMOGRAM FOR MALIGNANT NEOPLASM OF BREAST: ICD-10-CM

## 2025-04-09 PROCEDURE — 1159F MED LIST DOCD IN RCRD: CPT | Mod: CPTII,S$GLB,, | Performed by: STUDENT IN AN ORGANIZED HEALTH CARE EDUCATION/TRAINING PROGRAM

## 2025-04-09 PROCEDURE — 3078F DIAST BP <80 MM HG: CPT | Mod: CPTII,S$GLB,, | Performed by: STUDENT IN AN ORGANIZED HEALTH CARE EDUCATION/TRAINING PROGRAM

## 2025-04-09 PROCEDURE — G2211 COMPLEX E/M VISIT ADD ON: HCPCS | Mod: S$GLB,,, | Performed by: STUDENT IN AN ORGANIZED HEALTH CARE EDUCATION/TRAINING PROGRAM

## 2025-04-09 PROCEDURE — 1160F RVW MEDS BY RX/DR IN RCRD: CPT | Mod: CPTII,S$GLB,, | Performed by: STUDENT IN AN ORGANIZED HEALTH CARE EDUCATION/TRAINING PROGRAM

## 2025-04-09 PROCEDURE — 3008F BODY MASS INDEX DOCD: CPT | Mod: CPTII,S$GLB,, | Performed by: STUDENT IN AN ORGANIZED HEALTH CARE EDUCATION/TRAINING PROGRAM

## 2025-04-09 PROCEDURE — 99214 OFFICE O/P EST MOD 30 MIN: CPT | Mod: S$GLB,,, | Performed by: STUDENT IN AN ORGANIZED HEALTH CARE EDUCATION/TRAINING PROGRAM

## 2025-04-09 PROCEDURE — 3074F SYST BP LT 130 MM HG: CPT | Mod: CPTII,S$GLB,, | Performed by: STUDENT IN AN ORGANIZED HEALTH CARE EDUCATION/TRAINING PROGRAM

## 2025-04-09 RX ORDER — PREDNISONE 20 MG/1
40 TABLET ORAL DAILY
Qty: 10 TABLET | Refills: 0 | Status: SHIPPED | OUTPATIENT
Start: 2025-04-09 | End: 2025-04-14

## 2025-04-09 RX ORDER — PROMETHAZINE HYDROCHLORIDE 50 MG/1
50 TABLET ORAL EVERY 8 HOURS PRN
Qty: 60 TABLET | Refills: 1 | Status: SHIPPED | OUTPATIENT
Start: 2025-04-09

## 2025-04-09 RX ORDER — AZITHROMYCIN 250 MG/1
TABLET, FILM COATED ORAL
Qty: 6 TABLET | Refills: 0 | Status: SHIPPED | OUTPATIENT
Start: 2025-04-09 | End: 2025-04-14

## 2025-04-09 RX ORDER — IPRATROPIUM BROMIDE AND ALBUTEROL SULFATE 2.5; .5 MG/3ML; MG/3ML
3 SOLUTION RESPIRATORY (INHALATION) EVERY 6 HOURS PRN
Qty: 75 ML | Refills: 0 | Status: SHIPPED | OUTPATIENT
Start: 2025-04-09 | End: 2026-04-09

## 2025-04-09 RX ORDER — ALBUTEROL SULFATE 90 UG/1
2 INHALANT RESPIRATORY (INHALATION) EVERY 6 HOURS PRN
Qty: 18 G | Refills: 5 | Status: SHIPPED | OUTPATIENT
Start: 2025-04-09

## 2025-04-09 NOTE — PROGRESS NOTES
[facility]  Primary Care Clinic - [place], [place]    Family Medicine Office Visit    Subjective     Patient ID: Yenifer Cornelius is a 59 y.o. female who presents to clinic today to follow up.     Medical history, surgical history, medications, allergies, and social history were reviewed and updated.     Chief Complaint: Health Maintenance, COPD (Pt states nothing is working for her COPD when she walks), and Medication Refill (Phenergan)    HPI    She has a history of COPD in his currently on a fluticasone salmeterol inhaler and Spiriva inhaler and maximum dose from [facility] pulmonology.  She reported that she got the flu in February at a [employer] [place] and never recovered completely.  She reported that she was prescribed a Z-Jah at the time of her illness.  She is reporting worsening shortness a breath and wheezing.  Reported that she is just able to  her inhalers today.  She is unfortunately allergic to multiple antibiotics.  She is unsure if she has taken cephalosporins in the past and she has anaphylaxis associated with the penicillin.  I will prescribe her an additional Z-Jah and some prednisone.  Offered breathing treatment prior to her leaving clinic today, which she declined.    She is also requesting refill of her Phenergan for nausea.    Vitals:    04/09/25 1433   BP: 94/70   Pulse: 102   Resp: 16      Wt Readings from Last 3 Encounters:   04/09/25 1433 118 kg (260 lb 1.6 oz)   01/09/25 1306 113.9 kg (251 lb)   10/30/24 1143 115.4 kg (254 lb 8 oz)      Review of Systems    Review of Systems otherwise negative unless specified above.        Objective     Physical Exam  Vitals reviewed.   Constitutional:       General: She is not in acute distress.     Appearance: Normal appearance.   HENT:      Head: Normocephalic and atraumatic.      Nose: Nose normal.      Mouth/Throat:      Mouth: Mucous membranes are moist.   Cardiovascular:      Rate and Rhythm: Regular rhythm. Tachycardia present.       Heart sounds: No murmur heard.  Pulmonary:      Effort: Pulmonary effort is normal. No respiratory distress.      Breath sounds: Wheezing present.   Musculoskeletal:         General: Normal range of motion.   Skin:     General: Skin is warm and dry.   Neurological:      General: No focal deficit present.      Mental Status: She is alert and oriented to person, place, and time.   Psychiatric:         Mood and Affect: Mood normal.         Behavior: Behavior normal.            Assessment and Plan     Current Outpatient Medications   Medication Instructions    albuterol (PROVENTIL/VENTOLIN HFA) 90 mcg/actuation inhaler 2 puffs, Inhalation, Every 6 hours PRN    albuterol-ipratropium (DUO-NEB) 2.5 mg-0.5 mg/3 mL nebulizer solution 3 mLs, Nebulization, Every 6 hours PRN, Rescue    amLODIPine (NORVASC) 10 mg, Oral, Daily    azithromycin (Z-PHILIPPE) 250 MG tablet Take 2 tablets by mouth on day 1; Take 1 tablet by mouth on days 2-5      diclofenac (VOLTAREN) 75 mg, Oral, 2 times daily    diphenoxylate-atropine 2.5-0.025 mg (LOMOTIL) 2.5-0.025 mg per tablet 1 tablet, Oral, 4 times daily PRN    furosemide (LASIX) 20 mg, Oral, Daily PRN    gabapentin (NEURONTIN) 800 MG tablet TAKE 1 TABLET BY MOUTH 3 TIMES DAILY FOR NERVE PAIN.    nystatin (MYCOSTATIN) cream Topical (Top), 2 times daily    nystatin (MYCOSTATIN) powder Topical (Top), 2 times daily    omeprazole (PRILOSEC) 40 mg, Oral, 2 times daily before meals    oxyCODONE-acetaminophen (PERCOCET)  mg per tablet 1 tablet, Oral, Every 6 hours PRN    predniSONE (DELTASONE) 40 mg, Oral, Daily    promethazine (PHENERGAN) 50 mg, Oral, Every 8 hours PRN    rizatriptan (MAXALT) 10 mg    rosuvastatin (CRESTOR) 10 mg, Oral, Daily        1. Chronic obstructive pulmonary disease, unspecified COPD type  -     albuterol (PROVENTIL/VENTOLIN HFA) 90 mcg/actuation inhaler; Inhale 2 puffs into the lungs every 6 (six) hours as needed for Wheezing.  Dispense: 18 g; Refill: 5    2. COPD with  acute exacerbation  -     albuterol-ipratropium (DUO-NEB) 2.5 mg-0.5 mg/3 mL nebulizer solution; Take 3 mLs by nebulization every 6 (six) hours as needed for Wheezing. Rescue  Dispense: 75 mL; Refill: 0  -     predniSONE (DELTASONE) 20 MG tablet; Take 2 tablets (40 mg total) by mouth once daily. for 5 days  Dispense: 10 tablet; Refill: 0  -     azithromycin (Z-JAH) 250 MG tablet; Take 2 tablets by mouth on day 1; Take 1 tablet by mouth on days 2-5  Dispense: 6 tablet; Refill: 0    3. Nausea  -     promethazine (PHENERGAN) 50 MG tablet; Take 1 tablet (50 mg total) by mouth every 8 (eight) hours as needed for Nausea.  Dispense: 60 tablet; Refill: 1    4. Encounter for screening mammogram for malignant neoplasm of breast  -     Mammo Digital Screening Bilat w/ Horacio (XPD); Future; Expected date: 04/09/2025        Treating her for suspected COPD exacerbation with prednisone and a Z-Jah for 5 days.  Also sending in duo nebs and albuterol inhaler to use as needed.  Recommended that she touch base with her pulmonologist through [facility] to see if we can consolidate her inhalers to 1 so that she does not need to use so many medications lots.  Refilling her Phenergan.  Placing orders for mammogram.  We will otherwise plan to have her follow up in 3 months or sooner if needed.    Visit today included increased complexity associated with the care of the episodic problem COPD addressed and managing the longitudinal care of the patient due to the serious and/or complex managed problem(s) COPD.         Follow up in about 3 months (around 7/9/2025) for Follow up with Candido.    Questions were invited and answered. No other acute concerns at this time. Will plan to follow up as above or sooner if needed.     Betty Rey, DO  04/09/2025 4:16 PM       This dictation has been generated using Modal Fluency Dictation. Some phonetic errors may occur. Please contact author for clarification if needed.

## 2025-05-19 ENCOUNTER — OFFICE VISIT (OUTPATIENT)
Dept: FAMILY MEDICINE | Facility: CLINIC | Age: 60
End: 2025-05-19
Payer: MEDICAID

## 2025-05-19 VITALS
OXYGEN SATURATION: 96 % | DIASTOLIC BLOOD PRESSURE: 88 MMHG | BODY MASS INDEX: 44.16 KG/M2 | HEIGHT: 64 IN | WEIGHT: 258.69 LBS | SYSTOLIC BLOOD PRESSURE: 124 MMHG | HEART RATE: 99 BPM

## 2025-05-19 DIAGNOSIS — I70.0 AORTIC ATHEROSCLEROSIS: Primary | ICD-10-CM

## 2025-05-19 DIAGNOSIS — J44.9 CHRONIC OBSTRUCTIVE PULMONARY DISEASE, UNSPECIFIED COPD TYPE: ICD-10-CM

## 2025-05-19 DIAGNOSIS — R19.7 DIARRHEA, UNSPECIFIED TYPE: ICD-10-CM

## 2025-05-19 DIAGNOSIS — E66.01 CLASS 3 SEVERE OBESITY DUE TO EXCESS CALORIES WITH SERIOUS COMORBIDITY AND BODY MASS INDEX (BMI) OF 40.0 TO 44.9 IN ADULT: ICD-10-CM

## 2025-05-19 DIAGNOSIS — E78.5 DYSLIPIDEMIA: ICD-10-CM

## 2025-05-19 DIAGNOSIS — K21.9 GASTROESOPHAGEAL REFLUX DISEASE, UNSPECIFIED WHETHER ESOPHAGITIS PRESENT: ICD-10-CM

## 2025-05-19 DIAGNOSIS — E66.813 CLASS 3 SEVERE OBESITY DUE TO EXCESS CALORIES WITH SERIOUS COMORBIDITY AND BODY MASS INDEX (BMI) OF 40.0 TO 44.9 IN ADULT: ICD-10-CM

## 2025-05-19 PROCEDURE — 3008F BODY MASS INDEX DOCD: CPT | Mod: CPTII,S$GLB,, | Performed by: STUDENT IN AN ORGANIZED HEALTH CARE EDUCATION/TRAINING PROGRAM

## 2025-05-19 PROCEDURE — 1160F RVW MEDS BY RX/DR IN RCRD: CPT | Mod: CPTII,S$GLB,, | Performed by: STUDENT IN AN ORGANIZED HEALTH CARE EDUCATION/TRAINING PROGRAM

## 2025-05-19 PROCEDURE — 3079F DIAST BP 80-89 MM HG: CPT | Mod: CPTII,S$GLB,, | Performed by: STUDENT IN AN ORGANIZED HEALTH CARE EDUCATION/TRAINING PROGRAM

## 2025-05-19 PROCEDURE — 3074F SYST BP LT 130 MM HG: CPT | Mod: CPTII,S$GLB,, | Performed by: STUDENT IN AN ORGANIZED HEALTH CARE EDUCATION/TRAINING PROGRAM

## 2025-05-19 PROCEDURE — 1159F MED LIST DOCD IN RCRD: CPT | Mod: CPTII,S$GLB,, | Performed by: STUDENT IN AN ORGANIZED HEALTH CARE EDUCATION/TRAINING PROGRAM

## 2025-05-19 PROCEDURE — 99214 OFFICE O/P EST MOD 30 MIN: CPT | Mod: S$GLB,,, | Performed by: STUDENT IN AN ORGANIZED HEALTH CARE EDUCATION/TRAINING PROGRAM

## 2025-05-19 PROCEDURE — G2211 COMPLEX E/M VISIT ADD ON: HCPCS | Mod: S$GLB,,, | Performed by: STUDENT IN AN ORGANIZED HEALTH CARE EDUCATION/TRAINING PROGRAM

## 2025-05-19 RX ORDER — GABAPENTIN 400 MG/1
400 CAPSULE ORAL 3 TIMES DAILY
COMMUNITY
Start: 2025-05-07

## 2025-05-19 RX ORDER — OMEPRAZOLE 40 MG/1
40 CAPSULE, DELAYED RELEASE ORAL EVERY MORNING
Qty: 90 CAPSULE | Refills: 3 | Status: SHIPPED | OUTPATIENT
Start: 2025-05-19

## 2025-05-19 RX ORDER — TIOTROPIUM BROMIDE INHALATION SPRAY 3.12 UG/1
SPRAY, METERED RESPIRATORY (INHALATION)
COMMUNITY
Start: 2025-05-01

## 2025-05-19 RX ORDER — ROSUVASTATIN CALCIUM 10 MG/1
10 TABLET, COATED ORAL DAILY
Qty: 90 TABLET | Refills: 3 | Status: SHIPPED | OUTPATIENT
Start: 2025-05-19 | End: 2026-05-19

## 2025-05-19 RX ORDER — TIRZEPATIDE 2.5 MG/.5ML
2.5 INJECTION, SOLUTION SUBCUTANEOUS
Qty: 2 ML | Refills: 2 | Status: SHIPPED | OUTPATIENT
Start: 2025-05-19 | End: 2025-05-19

## 2025-05-19 RX ORDER — FLUTICASONE PROPIONATE AND SALMETEROL 250; 50 UG/1; UG/1
1 POWDER RESPIRATORY (INHALATION) 2 TIMES DAILY
COMMUNITY
Start: 2025-04-09

## 2025-05-19 RX ORDER — DIPHENOXYLATE HYDROCHLORIDE AND ATROPINE SULFATE 2.5; .025 MG/1; MG/1
1 TABLET ORAL 4 TIMES DAILY PRN
Qty: 120 TABLET | Refills: 2 | Status: SHIPPED | OUTPATIENT
Start: 2025-05-19

## 2025-05-19 NOTE — PROGRESS NOTES
[facility]  Primary Care Clinic - [place], [place]    Family Medicine Office Visit    Subjective     Patient ID: Yenifer Cornelius is a 59 y.o. female who presents to clinic today to follow up.     Medical history, surgical history, medications, allergies, and social history were reviewed and updated.     Chief Complaint: Health Maintenance    HPI    She has a history of obesity with current weight of 258 lb and BMI of 44.4.  She has recently been seen by pulmonology with recommendations to request tirzepatide due to concerns that her weight may be contributing to her worsening COPD.  She reported that she has done worse on her PFTs this year and they are considering switching her over to BiPAP at night for CAROLINE.  She is requesting a trial of this.  She is also seeing cardiology next month and they are possibly placing a stent.  Discussed that should this occur, we will likely need to switch her over to Wegovy.  They are also planning to switch her over to Trelegy, but this has not yet been approved.  Recommend that she touch base with her pulmonologist.    She has a history of dyslipidemia and has been prescribed Crestor.  She has been picking this up intermittently due to struggles with her insurance.  She reports she is only able to  6 medications per month.  She reported that her insurance is changing as of July.  We will prescribe this medication recommended that she picked this up every 3 months so that way she is only taking up for slots throughout the year.    She is requesting refill of her Lomotil that she uses intermittently to help with diarrhea.  No new or worsening symptoms reported.    She is also supposed to be taking omeprazole for reflux.  She is requesting refill of this as well.  No new or worsening symptoms reported.    Vitals:    05/19/25 0838   BP: 124/88   Pulse: 99      Wt Readings from Last 3 Encounters:   05/19/25 0838 117.4 kg (258 lb 11.4 oz)   04/09/25 1433 118 kg (260 lb 1.6  oz)   25 1306 113.9 kg (251 lb)      Review of Systems    Review of Systems otherwise negative unless specified above.        Objective     Physical Exam  Vitals reviewed.   Constitutional:       General: She is not in acute distress.     Appearance: Normal appearance. She is obese.   HENT:      Head: Normocephalic and atraumatic.      Nose: Nose normal.      Mouth/Throat:      Mouth: Mucous membranes are moist.   Cardiovascular:      Rate and Rhythm: Regular rhythm. Tachycardia present.      Heart sounds: No murmur heard.  Pulmonary:      Effort: Pulmonary effort is normal. No respiratory distress.      Breath sounds: Normal breath sounds.   Musculoskeletal:         General: Normal range of motion.   Skin:     General: Skin is warm and dry.   Neurological:      General: No focal deficit present.      Mental Status: She is alert and oriented to person, place, and time.   Psychiatric:         Mood and Affect: Mood normal.         Behavior: Behavior normal.            Assessment and Plan     Current Outpatient Medications   Medication Instructions    albuterol (PROVENTIL/VENTOLIN HFA) 90 mcg/actuation inhaler 2 puffs, Inhalation, Every 6 hours PRN    albuterol-ipratropium (DUO-NEB) 2.5 mg-0.5 mg/3 mL nebulizer solution 3 mLs, Nebulization, Every 6 hours PRN, Rescue    amLODIPine (NORVASC) 10 mg, Oral, Daily    diphenoxylate-atropine 2.5-0.025 mg (LOMOTIL) 2.5-0.025 mg per tablet 1 tablet, Oral, 4 times daily PRN    fluticasone-salmeterol diskus inhaler 250-50 mcg 1 puff, 2 times daily    furosemide (LASIX) 20 mg, Oral, Daily PRN    gabapentin (NEURONTIN) 400 mg, 3 times daily    omeprazole (PRILOSEC) 40 mg, Oral, Every morning    oxyCODONE-acetaminophen (PERCOCET)  mg per tablet 1 tablet, Oral, Every 6 hours PRN    promethazine (PHENERGAN) 50 mg, Oral, Every 8 hours PRN    rizatriptan (MAXALT) 10 mg    rosuvastatin (CRESTOR) 10 mg, Oral, Daily    SPIRIVA RESPIMAT 2.5 mcg/actuation inhaler SMARTSI  Puff(s) Via Inhaler Daily    ZEPBOUND 2.5 mg, Subcutaneous, Every 7 days        1. Aortic atherosclerosis  -     tirzepatide, weight loss, (ZEPBOUND) 2.5 mg/0.5 mL PnIj; Inject 2.5 mg into the skin every 7 days.  Dispense: 2 mL; Refill: 2  -     rosuvastatin (CRESTOR) 10 MG tablet; Take 1 tablet (10 mg total) by mouth once daily.  Dispense: 90 tablet; Refill: 3    2. Class 3 severe obesity due to excess calories with serious comorbidity and body mass index (BMI) of 40.0 to 44.9 in adult  -     tirzepatide, weight loss, (ZEPBOUND) 2.5 mg/0.5 mL PnIj; Inject 2.5 mg into the skin every 7 days.  Dispense: 2 mL; Refill: 2    3. Chronic obstructive pulmonary disease, unspecified COPD type    4. Dyslipidemia  -     rosuvastatin (CRESTOR) 10 MG tablet; Take 1 tablet (10 mg total) by mouth once daily.  Dispense: 90 tablet; Refill: 3    5. Diarrhea, unspecified type  -     diphenoxylate-atropine 2.5-0.025 mg (LOMOTIL) 2.5-0.025 mg per tablet; Take 1 tablet by mouth 4 (four) times daily as needed for Diarrhea.  Dispense: 120 tablet; Refill: 2    6. Gastroesophageal reflux disease, unspecified whether esophagitis present  -     omeprazole (PRILOSEC) 40 MG capsule; Take 1 capsule (40 mg total) by mouth every morning.  Dispense: 90 capsule; Refill: 3        Starting a trial of to his appetite to see if this will be approved by her insurance and affordable.  Refilling chronic medications as above.  She is planning to see her cardiologist (Dr. Tavarez) on June 27th.  She is also scheduled to see her allergist and oncologist in the next 2 months as well.  She is scheduled for several imaging studies.  We will plan to keep her scheduled follow up in July.    Visit today included increased complexity associated with the care of the episodic problem obesity addressed and managing the longitudinal care of the patient due to the serious and/or complex managed problem(s) obesity.         Follow up in about 2 months (around 7/19/2025) for  Follow up with Candido.    Questions were invited and answered. No other acute concerns at this time. Will plan to follow up as above or sooner if needed.     Betty Rey, DO  05/19/2025 9:18 AM       This dictation has been generated using Modal Fluency Dictation. Some phonetic errors may occur. Please contact author for clarification if needed.

## 2025-06-13 DIAGNOSIS — E66.01 CLASS 3 SEVERE OBESITY DUE TO EXCESS CALORIES WITH SERIOUS COMORBIDITY AND BODY MASS INDEX (BMI) OF 40.0 TO 44.9 IN ADULT: ICD-10-CM

## 2025-06-13 DIAGNOSIS — E66.813 CLASS 3 SEVERE OBESITY DUE TO EXCESS CALORIES WITH SERIOUS COMORBIDITY AND BODY MASS INDEX (BMI) OF 40.0 TO 44.9 IN ADULT: ICD-10-CM

## 2025-06-13 DIAGNOSIS — I70.0 AORTIC ATHEROSCLEROSIS: ICD-10-CM

## 2025-06-13 DIAGNOSIS — E66.01 MORBID OBESITY WITH BODY MASS INDEX (BMI) OF 40.0 TO 44.9 IN ADULT: Primary | ICD-10-CM

## 2025-07-10 ENCOUNTER — OFFICE VISIT (OUTPATIENT)
Dept: FAMILY MEDICINE | Facility: CLINIC | Age: 60
End: 2025-07-10
Payer: MEDICAID

## 2025-07-10 DIAGNOSIS — I10 PRIMARY HYPERTENSION: ICD-10-CM

## 2025-07-10 DIAGNOSIS — R11.0 NAUSEA: ICD-10-CM

## 2025-07-10 DIAGNOSIS — L02.91 ABSCESS: Primary | ICD-10-CM

## 2025-07-10 DIAGNOSIS — R60.9 SWELLING: ICD-10-CM

## 2025-07-10 RX ORDER — PROMETHAZINE HYDROCHLORIDE 50 MG/1
50 TABLET ORAL EVERY 8 HOURS PRN
Qty: 60 TABLET | Refills: 1 | Status: SHIPPED | OUTPATIENT
Start: 2025-07-10

## 2025-07-10 RX ORDER — AMLODIPINE BESYLATE 10 MG/1
10 TABLET ORAL DAILY
Qty: 90 TABLET | Refills: 1 | Status: SHIPPED | OUTPATIENT
Start: 2025-07-10 | End: 2026-07-10

## 2025-07-10 RX ORDER — FUROSEMIDE 20 MG/1
20 TABLET ORAL DAILY PRN
Qty: 30 TABLET | Refills: 2 | Status: SHIPPED | OUTPATIENT
Start: 2025-07-10

## 2025-07-10 NOTE — PROGRESS NOTES
Chief Complaint   Patient presents with    Medication Refill        HPI:    Had 2 abscesses. Was sent by Heme/Onc to surgery. She had I&D of both of them with surgery and was started on Levofloxacin. Taking this for 14 days.     She is scheduled for a heart cath on Monday at 7AM with Dr. Tavarez at [facility].     She sees her Pulmonologist next week as well.     She is requesting refill of her Phenergan for nausea.  She is also requesting refill of her Lasix for swelling.  No issues with either these medications.  She is also requiring refill of her amlodipine for her blood pressure.  She would like to eventually switch his medication over to something else that has causing less swelling.    She is also requesting referral to Dermatology as she is having frequent skin infections and we would like to see if there are other options to help decrease this risk.    ROS:  Negative unless specified above      General:  AOx3, well nourished and developed in no acute distress  Eyes:  PERRLA, EOMI, vision intact grossly  Neck:  trachea midline with no masses or thyromegaly  Musculoskeletal:  Normal posture.  Normal muscular development.  Neurological:  CN II-XII grossly intact based off of video interaction  Psych:  Normal mood and affect.  Able to demonstrate good judgement and personal insight.      Assessment/Plan:    Abscess  -     Ambulatory referral/consult to Dermatology; Future; Expected date: 07/17/2025    Nausea  -     promethazine (PHENERGAN) 50 MG tablet; Take 1 tablet (50 mg total) by mouth every 8 (eight) hours as needed for Nausea.  Dispense: 60 tablet; Refill: 1    Swelling  -     furosemide (LASIX) 20 MG tablet; Take 1 tablet (20 mg total) by mouth daily as needed (swelling).  Dispense: 30 tablet; Refill: 2    Primary hypertension, dx 2020  -     amLODIPine (NORVASC) 10 MG tablet; Take 1 tablet (10 mg total) by mouth once daily.  Dispense: 90 tablet; Refill: 1       Placing referral to Dermatology and  refilling chronic medications as above.  We will plan to have her follow up in 2 months after she has completed her follow up with the specialty and procedures.  We will plan to update her blood pressure regimen at that time as long as blood pressure is at goal.    Visit today included increased complexity associated with the care of the episodic problem hypertension addressed and managing the longitudinal care of the patient due to the serious and/or complex managed problem(s) hypertension.    The patient location is: [place]  The chief complaint leading to consultation is: medication refill    Visit type: audiovisual    Face to Face time with patient:   30 minutes of total time spent on the encounter, which includes face to face time and non-face to face time preparing to see the patient (eg, review of tests), Obtaining and/or reviewing separately obtained history, Documenting clinical information in the electronic or other health record, Independently interpreting results (not separately reported) and communicating results to the patient/family/caregiver, or Care coordination (not separately reported).         Each patient to whom he or she provides medical services by telemedicine is:  (1) informed of the relationship between the physician and patient and the respective role of any other health care provider with respect to management of the patient; and (2) notified that he or she may decline to receive medical services by telemedicine and may withdraw from such care at any time.    Notes:

## 2025-08-12 ENCOUNTER — TELEPHONE (OUTPATIENT)
Dept: FAMILY MEDICINE | Facility: CLINIC | Age: 60
End: 2025-08-12
Payer: MEDICAID

## 2025-08-19 ENCOUNTER — OFFICE VISIT (OUTPATIENT)
Dept: FAMILY MEDICINE | Facility: CLINIC | Age: 60
End: 2025-08-19
Payer: MEDICAID

## 2025-08-19 ENCOUNTER — HOSPITAL ENCOUNTER (OUTPATIENT)
Dept: RADIOLOGY | Facility: HOSPITAL | Age: 60
Discharge: HOME | End: 2025-08-19
Attending: NURSE PRACTITIONER
Payer: MEDICAID

## 2025-08-19 VITALS
BODY MASS INDEX: 42.4 KG/M2 | WEIGHT: 248.38 LBS | SYSTOLIC BLOOD PRESSURE: 128 MMHG | DIASTOLIC BLOOD PRESSURE: 88 MMHG | OXYGEN SATURATION: 95 % | HEIGHT: 64 IN | HEART RATE: 113 BPM

## 2025-08-19 DIAGNOSIS — J43.9 PULMONARY EMPHYSEMA, UNSPECIFIED EMPHYSEMA TYPE: ICD-10-CM

## 2025-08-19 DIAGNOSIS — G47.33 OSA ON CPAP: ICD-10-CM

## 2025-08-19 DIAGNOSIS — R00.0 TACHYCARDIA: ICD-10-CM

## 2025-08-19 DIAGNOSIS — Z87.891 PERSONAL HISTORY OF TOBACCO USE: ICD-10-CM

## 2025-08-19 DIAGNOSIS — R06.09 DYSPNEA ON EXERTION: ICD-10-CM

## 2025-08-19 DIAGNOSIS — R06.09 DYSPNEA ON EXERTION: Primary | ICD-10-CM

## 2025-08-19 PROCEDURE — 3008F BODY MASS INDEX DOCD: CPT | Mod: CPTII,,, | Performed by: NURSE PRACTITIONER

## 2025-08-19 PROCEDURE — 3074F SYST BP LT 130 MM HG: CPT | Mod: CPTII,,, | Performed by: NURSE PRACTITIONER

## 2025-08-19 PROCEDURE — 1160F RVW MEDS BY RX/DR IN RCRD: CPT | Mod: CPTII,,, | Performed by: NURSE PRACTITIONER

## 2025-08-19 PROCEDURE — 71046 X-RAY EXAM CHEST 2 VIEWS: CPT | Mod: 26,,, | Performed by: RADIOLOGY

## 2025-08-19 PROCEDURE — 71046 X-RAY EXAM CHEST 2 VIEWS: CPT | Mod: TC

## 2025-08-19 PROCEDURE — 99999 PR PBB SHADOW E&M-EST. PATIENT-LVL IV: CPT | Mod: PBBFAC,,, | Performed by: NURSE PRACTITIONER

## 2025-08-19 PROCEDURE — 3079F DIAST BP 80-89 MM HG: CPT | Mod: CPTII,,, | Performed by: NURSE PRACTITIONER

## 2025-08-19 PROCEDURE — 1159F MED LIST DOCD IN RCRD: CPT | Mod: CPTII,,, | Performed by: NURSE PRACTITIONER

## 2025-08-19 PROCEDURE — 99214 OFFICE O/P EST MOD 30 MIN: CPT | Mod: PBBFAC,25,PN | Performed by: NURSE PRACTITIONER

## 2025-08-19 PROCEDURE — 99215 OFFICE O/P EST HI 40 MIN: CPT | Mod: S$PBB,,, | Performed by: NURSE PRACTITIONER

## 2025-08-22 ENCOUNTER — PATIENT MESSAGE (OUTPATIENT)
Dept: FAMILY MEDICINE | Facility: CLINIC | Age: 60
End: 2025-08-22
Payer: MEDICAID

## 2025-08-22 ENCOUNTER — TELEPHONE (OUTPATIENT)
Dept: FAMILY MEDICINE | Facility: CLINIC | Age: 60
End: 2025-08-22
Payer: MEDICAID

## 2025-08-25 ENCOUNTER — TELEPHONE (OUTPATIENT)
Dept: FAMILY MEDICINE | Facility: CLINIC | Age: 60
End: 2025-08-25
Payer: MEDICAID

## 2025-08-28 ENCOUNTER — TELEPHONE (OUTPATIENT)
Dept: FAMILY MEDICINE | Facility: CLINIC | Age: 60
End: 2025-08-28
Payer: MEDICAID

## 2025-09-02 ENCOUNTER — OFFICE VISIT (OUTPATIENT)
Dept: FAMILY MEDICINE | Facility: CLINIC | Age: 60
End: 2025-09-02
Payer: MEDICAID

## 2025-09-02 VITALS
HEIGHT: 64 IN | DIASTOLIC BLOOD PRESSURE: 88 MMHG | WEIGHT: 250.31 LBS | OXYGEN SATURATION: 98 % | BODY MASS INDEX: 42.73 KG/M2 | HEART RATE: 101 BPM | SYSTOLIC BLOOD PRESSURE: 116 MMHG

## 2025-09-02 DIAGNOSIS — R09.81 NASAL CONGESTION: ICD-10-CM

## 2025-09-02 DIAGNOSIS — R06.09 DYSPNEA ON EXERTION: ICD-10-CM

## 2025-09-02 DIAGNOSIS — Z09 FOLLOW-UP EXAM: Primary | ICD-10-CM

## 2025-09-02 PROCEDURE — 99214 OFFICE O/P EST MOD 30 MIN: CPT | Mod: PBBFAC,PN | Performed by: NURSE PRACTITIONER

## 2025-09-02 PROCEDURE — 99999 PR PBB SHADOW E&M-EST. PATIENT-LVL IV: CPT | Mod: PBBFAC,,, | Performed by: NURSE PRACTITIONER

## 2025-09-02 RX ORDER — CLOPIDOGREL BISULFATE 75 MG/1
75 TABLET ORAL DAILY
COMMUNITY

## 2025-09-02 RX ORDER — ASPIRIN 325 MG
325 TABLET, DELAYED RELEASE (ENTERIC COATED) ORAL DAILY
COMMUNITY

## 2025-09-04 ENCOUNTER — PROCEDURE VISIT (OUTPATIENT)
Dept: RESPIRATORY THERAPY | Facility: HOSPITAL | Age: 60
End: 2025-09-04
Payer: MEDICAID

## 2025-09-04 DIAGNOSIS — J43.9 PULMONARY EMPHYSEMA, UNSPECIFIED EMPHYSEMA TYPE: ICD-10-CM

## 2025-09-04 DIAGNOSIS — R06.09 DYSPNEA ON EXERTION: ICD-10-CM

## 2025-09-04 PROCEDURE — 94618 PULMONARY STRESS TESTING: CPT
